# Patient Record
(demographics unavailable — no encounter records)

---

## 2025-05-27 NOTE — HISTORY OF PRESENT ILLNESS
[FreeTextEntry1] : 35 year old M  requesting vasectomy. His wife is currenlty pregnant with their first child. He does not want more. His wife is not here with him, but he says that she agrees that she does not want more children. He denies any scrotal abnormalities. He does reports low testosterone and was previously treated by Dr Vuong with TRT. He was LFTU during COVID pandemic. He would like to resume TRT.   05/27/2025: Patient presents for follow up. He reports flank pain and then resolved. RBUS (5/1/2025) 7 mm bladder stone, no renal stones or hydro. Requesting resumption of TRT. fatigue, low libido, etc.

## 2025-05-27 NOTE — PHYSICAL EXAM
[General Appearance - Well Developed] : well developed [General Appearance - Well Nourished] : well nourished [Normal Appearance] : normal appearance [Well Groomed] : well groomed [General Appearance - In No Acute Distress] : no acute distress [Edema] : no peripheral edema [Respiration, Rhythm And Depth] : normal respiratory rhythm and effort [Exaggerated Use Of Accessory Muscles For Inspiration] : no accessory muscle use [Abdomen Soft] : soft [Abdomen Tenderness] : non-tender [Costovertebral Angle Tenderness] : no ~M costovertebral angle tenderness [Urethral Meatus] : meatus normal [Normal Station and Gait] : the gait and station were normal for the patient's age [] : no rash [No Focal Deficits] : no focal deficits [Oriented To Time, Place, And Person] : oriented to person, place, and time [Affect] : the affect was normal [Mood] : the mood was normal [Not Anxious] : not anxious [No Palpable Adenopathy] : no palpable adenopathy

## 2025-05-27 NOTE — ASSESSMENT
[FreeTextEntry1] : 37 year old man with recently passed stone. Suggest bladder US to confirm passage.  For low testosterone, may resume. WIll obtain labs in 6 weeks.